# Patient Record
Sex: FEMALE | Race: BLACK OR AFRICAN AMERICAN | NOT HISPANIC OR LATINO | URBAN - METROPOLITAN AREA
[De-identification: names, ages, dates, MRNs, and addresses within clinical notes are randomized per-mention and may not be internally consistent; named-entity substitution may affect disease eponyms.]

---

## 2021-11-30 ENCOUNTER — ESTABLISHED COMPREHENSIVE EXAM (OUTPATIENT)
Dept: URBAN - METROPOLITAN AREA CLINIC 7 | Facility: CLINIC | Age: 71
End: 2021-11-30

## 2021-11-30 DIAGNOSIS — H52.03: ICD-10-CM

## 2021-11-30 DIAGNOSIS — H47.233: ICD-10-CM

## 2021-11-30 PROCEDURE — 92250 FUNDUS PHOTOGRAPHY W/I&R: CPT

## 2021-11-30 PROCEDURE — 92014 COMPRE OPH EXAM EST PT 1/>: CPT

## 2021-11-30 PROCEDURE — 92015 DETERMINE REFRACTIVE STATE: CPT

## 2021-11-30 ASSESSMENT — TONOMETRY
OS_IOP_MMHG: 19
OD_IOP_MMHG: 19

## 2021-11-30 ASSESSMENT — VISUAL ACUITY
OS_CC: 20/20-1
OS_SC: 20/30
OD_SC: 20/30-3
OU_SC: 20/30
OU_CC: 20/20
OD_CC: 20/20-1

## 2021-11-30 ASSESSMENT — KERATOMETRY
OD_K1POWER_DIOPTERS: 42.25
OD_K2POWER_DIOPTERS: 43.00
OD_AXISANGLE_DEGREES: 016
OD_AXISANGLE2_DEGREES: 106

## 2022-06-18 RX ORDER — CALCIUM CARBONATE 200(500)MG
TABLET,CHEWABLE ORAL
COMMUNITY
End: 2022-10-04

## 2022-06-18 RX ORDER — DILTIAZEM HYDROCHLORIDE EXTENDED-RELEASE TABLETS 240 MG/1
TABLET, EXTENDED RELEASE ORAL
COMMUNITY
End: 2022-09-22 | Stop reason: SDUPTHER

## 2022-06-18 RX ORDER — AMOXICILLIN 875 MG/1
TABLET, COATED ORAL
COMMUNITY
End: 2022-08-17

## 2022-06-18 RX ORDER — NAPROXEN SODIUM 220 MG
TABLET ORAL
COMMUNITY
End: 2022-08-17

## 2022-06-18 RX ORDER — IBUPROFEN 800 MG/1
TABLET ORAL
COMMUNITY
End: 2022-08-17

## 2022-06-18 RX ORDER — DIPHENOXYLATE HYDROCHLORIDE AND ATROPINE SULFATE 2.5; .025 MG/1; MG/1
TABLET ORAL
COMMUNITY
Start: 2018-04-09 | End: 2022-08-17

## 2022-06-18 RX ORDER — ATORVASTATIN CALCIUM 20 MG/1
TABLET, FILM COATED ORAL
COMMUNITY
Start: 2016-02-17

## 2022-08-05 PROBLEM — N39.41 URGE INCONTINENCE: Status: ACTIVE | Noted: 2022-08-05

## 2022-08-05 PROBLEM — I48.91 ATRIAL FIBRILLATION (HCC): Status: ACTIVE | Noted: 2022-08-05

## 2022-08-05 PROBLEM — N89.8 VAGINAL LEUKORRHEA: Status: ACTIVE | Noted: 2022-08-05

## 2022-08-05 PROBLEM — Z96.652 HISTORY OF LEFT KNEE REPLACEMENT: Status: ACTIVE | Noted: 2022-08-05

## 2022-08-05 PROBLEM — N76.0 VAGINITIS AND VULVOVAGINITIS: Status: ACTIVE | Noted: 2022-08-05

## 2022-08-05 PROBLEM — N90.89 VULVAR LESION: Status: ACTIVE | Noted: 2022-08-05

## 2022-08-05 PROBLEM — M72.2 PLANTAR FASCIAL FIBROMATOSIS: Status: ACTIVE | Noted: 2022-08-05

## 2022-08-05 PROBLEM — N61.1 LEFT BREAST ABSCESS: Status: ACTIVE | Noted: 2022-08-05

## 2022-08-05 PROBLEM — M16.0 BILATERAL HIP JOINT ARTHRITIS: Status: ACTIVE | Noted: 2022-08-05

## 2022-08-05 PROBLEM — M77.30 CALCANEAL SPUR: Status: ACTIVE | Noted: 2022-08-05

## 2022-08-05 PROBLEM — M54.50 MIDLINE LOW BACK PAIN WITHOUT SCIATICA: Status: ACTIVE | Noted: 2022-08-05

## 2022-08-05 PROBLEM — E66.01 MORBID OBESITY DUE TO EXCESS CALORIES (HCC): Status: ACTIVE | Noted: 2022-08-05

## 2022-08-05 PROBLEM — M76.31 ILIOTIBIAL BAND SYNDROME, RIGHT LEG: Status: ACTIVE | Noted: 2022-08-05

## 2022-08-05 PROBLEM — E66.9 OBESITY: Status: ACTIVE | Noted: 2022-08-05

## 2022-08-05 PROBLEM — M19.90 ARTHRITIS: Status: ACTIVE | Noted: 2022-08-05

## 2022-08-05 PROBLEM — N64.9 LESION OF NIPPLE: Status: ACTIVE | Noted: 2022-08-05

## 2022-08-05 PROBLEM — E78.00 PURE HYPERCHOLESTEROLEMIA: Status: ACTIVE | Noted: 2022-08-05

## 2022-08-05 PROBLEM — L29.9 PRURITUS: Status: ACTIVE | Noted: 2022-08-05

## 2022-08-05 PROBLEM — M17.11 ARTHRITIS OF RIGHT KNEE: Status: ACTIVE | Noted: 2022-08-05

## 2022-08-05 PROBLEM — I10 HYPERTENSION: Status: ACTIVE | Noted: 2022-08-05

## 2022-08-05 PROBLEM — N95.0 POSTMENOPAUSAL BLEEDING: Status: ACTIVE | Noted: 2022-08-05

## 2022-08-05 PROBLEM — R87.810 CERVICAL HIGH RISK HUMAN PAPILLOMAVIRUS (HPV) DNA TEST POSITIVE: Status: ACTIVE | Noted: 2022-08-05

## 2022-10-03 PROBLEM — K21.9 GASTROESOPHAGEAL REFLUX DISEASE: Status: ACTIVE | Noted: 2022-10-03

## 2022-10-03 PROBLEM — K63.5 POLYP OF COLON: Status: ACTIVE | Noted: 2022-10-03

## 2022-10-03 PROBLEM — N90.3 VULVAR INTRAEPITHELIAL NEOPLASIA (VIN): Status: ACTIVE | Noted: 2022-10-03

## 2022-10-03 PROBLEM — Z97.2 DENTAL BRIDGE PRESENT: Status: ACTIVE | Noted: 2022-10-03

## 2022-10-03 PROBLEM — Z97.3 WEARS GLASSES: Status: ACTIVE | Noted: 2022-10-03

## 2022-11-09 NOTE — PATIENT DISCUSSION
IOP elevated on two consecutive visits. 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 Will be difficult to assess glaucoma damage on OCT or VF. Will perform VF and pachymetry. Will start on topical drops at next visit. Pt educated.

## 2022-11-09 NOTE — PATIENT DISCUSSION
Patient defers YAG Laser Capsulotomy. RN NOTES,



CALLED DR KYAW BAIRD TO INFORM PATIENT'S CONDITION, WHILE INFORMING DOCTOR, PATIENT 
BECAME UNRESPONSIVE, CODE DANAY STARTED ,  DID NOT GIVE FURTHER ORDERS BUT TO RUN THE CODE, 
CODE STARTED AT 0000, PLEASE REFER TO CODE BLUE SHEET FOR FURTHER DETAILS.

-------------------------------------------------------------------------------

Addendum: 11/24/21 at 0130 by MANE BALLARD RN

-------------------------------------------------------------------------------

WRONG TIME DOCUMENTED

## 2022-11-21 NOTE — PATIENT DISCUSSION
IOP elevated. 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 Will be difficult to assess glaucoma damage on OCT or VF. Will perform VF and pachymetry. Will start on prophylactic meds. Pt educated.

## 2023-09-26 ASSESSMENT — KERATOMETRY
OD_AXISANGLE_DEGREES: 016
OD_K2POWER_DIOPTERS: 43.00
OS_AXISANGLE_DEGREES: 53
OD_AXISANGLE2_DEGREES: 106
OD_K1POWER_DIOPTERS: 42.25
OS_K2POWER_DIOPTERS: 42.75
OD_AXISANGLE2_DEGREES: 99
OS_AXISANGLE2_DEGREES: 143
OD_K2POWER_DIOPTERS: 42.75
OS_K1POWER_DIOPTERS: 42.50
OD_AXISANGLE_DEGREES: 9

## 2023-09-27 ENCOUNTER — DIAGNOSTICS ONLY (OUTPATIENT)
Dept: URBAN - METROPOLITAN AREA CLINIC 10 | Facility: CLINIC | Age: 73
End: 2023-09-27

## 2023-09-27 DIAGNOSIS — H47.233: ICD-10-CM

## 2023-09-27 PROCEDURE — 92014 COMPRE OPH EXAM EST PT 1/>: CPT

## 2023-09-27 PROCEDURE — 92083 EXTENDED VISUAL FIELD XM: CPT

## 2023-09-27 PROCEDURE — 92133 CPTRZD OPH DX IMG PST SGM ON: CPT

## 2023-09-27 ASSESSMENT — VISUAL ACUITY
OD_CC: 20/25
OS_CC: 20/25-1

## 2023-09-27 ASSESSMENT — TONOMETRY
OD_IOP_MMHG: 18
OS_IOP_MMHG: 17

## 2024-04-09 ENCOUNTER — ESTABLISHED PATIENT (OUTPATIENT)
Facility: LOCATION | Age: 74
End: 2024-04-09

## 2024-04-09 DIAGNOSIS — H47.233: ICD-10-CM

## 2024-04-09 DIAGNOSIS — H52.03: ICD-10-CM

## 2024-04-09 PROCEDURE — 92250 FUNDUS PHOTOGRAPHY W/I&R: CPT

## 2024-04-09 PROCEDURE — 92014 COMPRE OPH EXAM EST PT 1/>: CPT

## 2024-04-09 PROCEDURE — 92015 DETERMINE REFRACTIVE STATE: CPT

## 2024-04-09 ASSESSMENT — KERATOMETRY
OS_K1POWER_DIOPTERS: 42.50
OS_K2POWER_DIOPTERS: 42.75
OD_AXISANGLE2_DEGREES: 99
OD_AXISANGLE_DEGREES: 9
OS_AXISANGLE_DEGREES: 53
OD_K1POWER_DIOPTERS: 42.25
OD_K2POWER_DIOPTERS: 42.75
OS_AXISANGLE2_DEGREES: 143

## 2024-04-09 ASSESSMENT — VISUAL ACUITY
OD_CC: 20/40
OU_CC: 20/30-2
OS_CC: 20/30-2

## 2024-04-09 ASSESSMENT — TONOMETRY
OS_IOP_MMHG: 21
OD_IOP_MMHG: 29

## 2025-01-17 ENCOUNTER — FOLLOW UP (OUTPATIENT)
Age: 75
End: 2025-01-17

## 2025-01-17 DIAGNOSIS — H47.233: ICD-10-CM

## 2025-01-17 PROCEDURE — 92133 CPTRZD OPH DX IMG PST SGM ON: CPT

## 2025-01-17 PROCEDURE — 92083 EXTENDED VISUAL FIELD XM: CPT

## 2025-01-17 PROCEDURE — 92014 COMPRE OPH EXAM EST PT 1/>: CPT

## 2025-07-21 ENCOUNTER — COMPREHENSIVE EXAM (OUTPATIENT)
Age: 75
End: 2025-07-21

## 2025-07-21 DIAGNOSIS — H52.03: ICD-10-CM

## 2025-07-21 DIAGNOSIS — H47.233: ICD-10-CM

## 2025-07-21 DIAGNOSIS — H25.813: ICD-10-CM

## 2025-07-21 PROCEDURE — 92014 COMPRE OPH EXAM EST PT 1/>: CPT

## 2025-07-21 PROCEDURE — 92015 DETERMINE REFRACTIVE STATE: CPT

## 2025-07-21 PROCEDURE — 92250 FUNDUS PHOTOGRAPHY W/I&R: CPT
